# Patient Record
Sex: MALE | Race: WHITE | NOT HISPANIC OR LATINO | URBAN - METROPOLITAN AREA
[De-identification: names, ages, dates, MRNs, and addresses within clinical notes are randomized per-mention and may not be internally consistent; named-entity substitution may affect disease eponyms.]

---

## 2020-05-07 NOTE — PATIENT DISCUSSION
- Follow up in 6 months for [facility]

## 2020-07-16 NOTE — PATIENT DISCUSSION
- Advised best possible control of blood glucose, BP, cholesterol
- Continue Restasis BID
- Discussed the r/b/a for YAG capsulotomy and pt wishes to proceed
- Follow up in 1 month, MRx, dilate OD
- Large piece of cortex involving the visual axis OD
- Likely limiting visual acuity
- Lotemax sample given to use BID until empty
- No exudative changes
- Nonproliferative, mild
- Nonsmoker
- Note sent to PCP
- Primarily aqueous tear deficiency
- Start artificial tears BID-TID OU
- Will follow every 6 months
- YAG cap today OD, able to cut a hole in the center of his retained cortex.  High energy, used 113 shots, 339 total mJ
- s/p YAG cap OS
Continue: Restasis (cyclosporine): dropperette: 0.05% 1 drop twice a day as directed into both eyes
Diabetic retinopathy OU
Diagnosis:Posterior capsular fibrosis, right eye
Dry eye syndrome OU
General:
Medications:
Nonexudative ARMD OU
Procedures:
Pseudophakia OU:
YAG laser capsulotomy: OD
Clothing

## 2021-09-04 NOTE — PATIENT DISCUSSION
- s/p YAG cap OU Problem: Falls - Risk of  Goal: *Absence of Falls  Description: Document Lakeshia Quiroga Fall Risk and appropriate interventions in the flowsheet.   Outcome: Progressing Towards Goal  Note: Fall Risk Interventions:  Mobility Interventions: Bed/chair exit alarm, Patient to call before getting OOB, Utilize walker, cane, or other assistive device         Medication Interventions: Bed/chair exit alarm, Patient to call before getting OOB, Teach patient to arise slowly                   Problem: Patient Education: Go to Patient Education Activity  Goal: Patient/Family Education  Outcome: Progressing Towards Goal     Problem: Patient Education: Go to Patient Education Activity  Goal: Patient/Family Education  Outcome: Progressing Towards Goal     Problem: Patient Education: Go to Patient Education Activity  Goal: Patient/Family Education  Outcome: Progressing Towards Goal     Problem: Patient Education: Go to Patient Education Activity  Goal: Patient/Family Education  Outcome: Progressing Towards Goal

## 2021-11-05 ENCOUNTER — PREPPED CHART (OUTPATIENT)
Dept: URBAN - METROPOLITAN AREA CLINIC 20 | Facility: CLINIC | Age: 81
End: 2021-11-05

## 2022-04-22 ASSESSMENT — TONOMETRY
OD_IOP_MMHG: 10
OS_IOP_MMHG: 10

## 2022-04-22 ASSESSMENT — VISUAL ACUITY
OU_CC: 20/20
OS_CC: 20/20
OD_CC: 20/20

## 2022-06-16 ENCOUNTER — FOLLOW UP (OUTPATIENT)
Dept: URBAN - METROPOLITAN AREA CLINIC 20 | Facility: CLINIC | Age: 82
End: 2022-06-16

## 2022-06-16 DIAGNOSIS — H40.1132: ICD-10-CM

## 2022-06-16 PROCEDURE — 99213 OFFICE O/P EST LOW 20 MIN: CPT

## 2022-06-16 PROCEDURE — 92083 EXTENDED VISUAL FIELD XM: CPT

## 2022-06-16 ASSESSMENT — KERATOMETRY
OS_AXISANGLE_DEGREES: 8
OS_K1POWER_DIOPTERS: 43.50
OD_AXISANGLE_DEGREES: 3
OS_K2POWER_DIOPTERS: 44.75
OS_AXISANGLE2_DEGREES: 98
OD_K1POWER_DIOPTERS: 43.00
OD_K2POWER_DIOPTERS: 44.00
OD_AXISANGLE2_DEGREES: 93

## 2022-06-16 ASSESSMENT — TONOMETRY
OD_IOP_MMHG: 12
OS_IOP_MMHG: 11

## 2022-06-16 ASSESSMENT — VISUAL ACUITY
OS_CC: 20/20
OD_CC: 20/20-1
OU_CC: J1+

## 2022-06-16 NOTE — PATIENT DISCUSSION
IOP is in target range. The patient will continue the current management of Brimonidine BID OU, Latanoprost QHS OU, and Dorz/Timolol BID OU.

## 2024-01-09 ENCOUNTER — TECH ONLY (OUTPATIENT)
Dept: URBAN - METROPOLITAN AREA CLINIC 20 | Facility: CLINIC | Age: 84
End: 2024-01-09

## 2024-01-09 DIAGNOSIS — H40.1132: ICD-10-CM

## 2024-01-09 PROCEDURE — 92083 EXTENDED VISUAL FIELD XM: CPT

## 2024-01-09 PROCEDURE — 99211T TECH SERVICE

## 2024-01-29 ENCOUNTER — ESTABLISHED PATIENT (OUTPATIENT)
Dept: URBAN - METROPOLITAN AREA CLINIC 20 | Facility: CLINIC | Age: 84
End: 2024-01-29

## 2024-01-29 DIAGNOSIS — H40.1132: ICD-10-CM

## 2024-01-29 PROCEDURE — 99214 OFFICE O/P EST MOD 30 MIN: CPT

## 2024-01-29 ASSESSMENT — VISUAL ACUITY
OD_CC: 20/20
OS_CC: 20/20

## 2024-01-29 ASSESSMENT — TONOMETRY
OD_IOP_MMHG: 15
OS_IOP_MMHG: 16

## 2024-07-29 ENCOUNTER — FOLLOW UP (OUTPATIENT)
Dept: URBAN - METROPOLITAN AREA CLINIC 20 | Facility: CLINIC | Age: 84
End: 2024-07-29

## 2024-07-29 DIAGNOSIS — H40.1132: ICD-10-CM

## 2024-07-29 PROCEDURE — 99213 OFFICE O/P EST LOW 20 MIN: CPT

## 2024-07-29 ASSESSMENT — VISUAL ACUITY
OS_CC: J1+
OS_CC: 20/25
OD_CC: J1+
OD_CC: 20/20-1
OU_CC: J1+
OU_CC: 20/20-1

## 2024-07-29 ASSESSMENT — TONOMETRY
OD_IOP_MMHG: 14
OS_IOP_MMHG: 12

## 2025-02-04 ENCOUNTER — TECH ONLY (OUTPATIENT)
Age: 85
End: 2025-02-04

## 2025-02-04 DIAGNOSIS — H40.1132: ICD-10-CM

## 2025-02-04 DIAGNOSIS — H35.363: ICD-10-CM

## 2025-02-04 PROCEDURE — 99211T TECH SERVICE

## 2025-02-04 PROCEDURE — 92083 EXTENDED VISUAL FIELD XM: CPT

## 2025-02-04 PROCEDURE — 92134 CPTRZ OPH DX IMG PST SGM RTA: CPT

## 2025-02-17 ENCOUNTER — FOLLOW UP (OUTPATIENT)
Age: 85
End: 2025-02-17

## 2025-02-17 DIAGNOSIS — H40.1132: ICD-10-CM

## 2025-02-17 DIAGNOSIS — Z79.4: ICD-10-CM

## 2025-02-17 DIAGNOSIS — E11.9: ICD-10-CM

## 2025-02-17 DIAGNOSIS — H16.223: ICD-10-CM

## 2025-02-17 PROCEDURE — 99214 OFFICE O/P EST MOD 30 MIN: CPT

## 2025-08-18 ENCOUNTER — COMPREHENSIVE EXAM (OUTPATIENT)
Age: 85
End: 2025-08-18

## 2025-08-18 DIAGNOSIS — H40.1132: ICD-10-CM

## 2025-08-18 PROCEDURE — 99214 OFFICE O/P EST MOD 30 MIN: CPT

## 2025-08-18 PROCEDURE — 92133 CPTRZD OPH DX IMG PST SGM ON: CPT

## 2025-08-18 RX ORDER — DORZOLAMIDE HYDROCHLORIDE TIMOLOL MALEATE 20; 5 MG/ML; MG/ML: 1 SOLUTION/ DROPS OPHTHALMIC TWICE A DAY
